# Patient Record
(demographics unavailable — no encounter records)

---

## 2025-07-14 NOTE — PHYSICAL EXAM
[de-identified] : Hip was ranged without restriction or pain. He is neurologically intact, without overt weakness, walking without aids, and can walk on his toes, heels, and step up and down. He has no long-track signs or hyperreflexia, and no visible deformities. [de-identified] : XR: Tops of bilateral hips appear concentric and well preserved.

## 2025-07-14 NOTE — HISTORY OF PRESENT ILLNESS
[de-identified] : Flynn Page is a 77 year old male presenting for pain in lumbar spine. Patient's spouse is Faustina Page. He reports his wife is doing exceptionally well, has gained weight and is exercising x3 days per week. He reports progressive stiffness and discomfort in the lower back with radiating left-sided thigh pain.

## 2025-07-14 NOTE — PHYSICAL EXAM
[de-identified] : Hip was ranged without restriction or pain. He is neurologically intact, without overt weakness, walking without aids, and can walk on his toes, heels, and step up and down. He has no long-track signs or hyperreflexia, and no visible deformities. [de-identified] : XR: Tops of bilateral hips appear concentric and well preserved.

## 2025-07-14 NOTE — ADDENDUM
[FreeTextEntry1] : I, Faustina Alexander, acted as a scribe on behalf of Dr. Alberto Mixon on 07/10/2025. 
[FreeTextEntry1] : I, Faustina Alexander, acted as a scribe on behalf of Dr. Alberto Mixon on 07/10/2025. 
48

## 2025-07-14 NOTE — PLAN
[TextEntry] : The patient had mild degenerative scoliosis with asymmetric disc-based narrowing which could be contributing to lateral recess and foraminal stenosis possibly at L3-4 or L4-5, which is consistent with symptoms.  He is referred for MRI that will be completed this week prior to leaving for [place] for several weeks.  The patient will contact this office via email follow completion of imaging. After review and discussion of imaging, he will likely be referred for transforaminal epidural steroid injection pending imaging results.  Heidi Ellison at [facility] was contacted to assist in expediting the process of obtaining these images.

## 2025-07-14 NOTE — HISTORY OF PRESENT ILLNESS
[de-identified] : Flynn Page is a 77 year old male presenting for pain in lumbar spine. Patient's spouse is Faustina Page. He reports his wife is doing exceptionally well, has gained weight and is exercising x3 days per week. He reports progressive stiffness and discomfort in the lower back with radiating left-sided thigh pain.

## 2025-07-14 NOTE — END OF VISIT
[FreeTextEntry3] : All medical record entries made by the Scribe were at my, Dr. Alberto Mixon, direction and personally dictated by me. I have reviewed the chart and agree that the record accurately reflects my personal performance of the history, physical exam, assessment and plan. I have also personally directed, reviewed, and agreed with the chart.

## 2025-07-14 NOTE — PLAN
[TextEntry] : The patient had mild degenerative scoliosis with asymmetric disc-based narrowing which could be contributing to lateral recess and foraminal stenosis possibly at L3-4 or L4-5, which is consistent with symptoms.  He is referred for MRI that will be completed this week prior to leaving for [place] for several weeks.  The patient will contact this office via email follow completion of imaging. After review and discussion of imaging, he will likely be referred for transforaminal epidural steroid injection pending imaging results.  Heidi Ellison at [facility] was contacted to assist in expediting the process of obtaining these images.

## 2025-07-18 NOTE — PHYSICAL EXAM
[de-identified] : He has no weakness. He is neurologically intact. He is walking without aides. He can easily walk on his toes and heels.

## 2025-07-18 NOTE — END OF VISIT
[FreeTextEntry3] : Documented by Genie Harris acting as a scribe for Alberto Mixon on 07/17/2025.   All medical record entries made by the Scribe were at my, Dr. Alberto Mixon, direction and personally dictated by me on 07/17/2025. I have reviewed the chart and agree that the record accurately reflects my personal performance of the history, physical exam, assessment, and plan. I have also personally directed, reviewed, and agreed with the chart.

## 2025-07-18 NOTE — HISTORY OF PRESENT ILLNESS
[de-identified] : Flynn Page is Faustina's . He is 77 years old. He has predominantly left-sided leg pain. 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

## 2025-07-18 NOTE — ADDENDUM
[FreeTextEntry1] : I, Genie Harris (scribe) assisted in filling out this chart under the dictation of Alberto Mixon on 07/17/2025.

## 2025-07-18 NOTE — PHYSICAL EXAM
[de-identified] : He has no weakness. He is neurologically intact. He is walking without aides. He can easily walk on his toes and heels.

## 2025-07-18 NOTE — HISTORY OF PRESENT ILLNESS
[de-identified] : Flynn Page is Faustina's . He is 77 years old. He has predominantly left-sided leg pain. 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

## 2025-07-18 NOTE — PLAN
[TextEntry] : For this, I recommended a Transforaminal ISMAEL left at L4-5 with my colleague Dr. Hussein Badillo. I am optimistic this will improve, if not resolve his radiating leg symptoms. He will follow up with us when he returns from his injection and his time spent on [place].

## 2025-07-18 NOTE — PLAN
[TextEntry] : For this, I recommended a Transforaminal ISMAEL left at L4-5 with my colleague Dr. Hussein Badillo. I am optimistic this will improve, if not resolve his radiating leg symptoms. He will follow up with us when he returns from his injection and his time spent on [place].